# Patient Record
Sex: MALE | Race: WHITE | Employment: FULL TIME | URBAN - METROPOLITAN AREA
[De-identification: names, ages, dates, MRNs, and addresses within clinical notes are randomized per-mention and may not be internally consistent; named-entity substitution may affect disease eponyms.]

---

## 2017-08-21 ENCOUNTER — OFFICE VISIT (OUTPATIENT)
Dept: ORTHOPEDIC SURGERY | Age: 37
End: 2017-08-21

## 2017-08-21 VITALS
SYSTOLIC BLOOD PRESSURE: 123 MMHG | WEIGHT: 195 LBS | HEIGHT: 72 IN | BODY MASS INDEX: 26.41 KG/M2 | DIASTOLIC BLOOD PRESSURE: 72 MMHG

## 2017-08-21 DIAGNOSIS — M25.572 ACUTE LEFT ANKLE PAIN: Primary | ICD-10-CM

## 2017-08-21 DIAGNOSIS — M25.561 ACUTE PAIN OF RIGHT KNEE: ICD-10-CM

## 2017-08-21 PROCEDURE — 99204 OFFICE O/P NEW MOD 45 MIN: CPT | Performed by: ORTHOPAEDIC SURGERY

## 2017-08-21 PROCEDURE — 73564 X-RAY EXAM KNEE 4 OR MORE: CPT | Performed by: ORTHOPAEDIC SURGERY

## 2017-08-21 PROCEDURE — 73610 X-RAY EXAM OF ANKLE: CPT | Performed by: ORTHOPAEDIC SURGERY

## 2017-09-14 ENCOUNTER — OFFICE VISIT (OUTPATIENT)
Dept: ORTHOPEDIC SURGERY | Age: 37
End: 2017-09-14

## 2017-09-14 VITALS — BODY MASS INDEX: 26.41 KG/M2 | WEIGHT: 195 LBS | HEIGHT: 72 IN

## 2017-09-14 DIAGNOSIS — M22.2X1 PATELLOFEMORAL PAIN SYNDROME OF RIGHT KNEE: ICD-10-CM

## 2017-09-14 DIAGNOSIS — M76.31 IT BAND SYNDROME, RIGHT: Primary | ICD-10-CM

## 2017-09-14 PROCEDURE — 99214 OFFICE O/P EST MOD 30 MIN: CPT | Performed by: ORTHOPAEDIC SURGERY

## 2017-09-22 ENCOUNTER — HOSPITAL ENCOUNTER (OUTPATIENT)
Dept: PHYSICAL THERAPY | Age: 37
Discharge: HOME | End: 2017-09-30
Admitting: ORTHOPAEDIC SURGERY

## 2017-09-22 NOTE — PLAN OF CARE
[place] [address]  Phone [phone]   Fax [phone]                                                       Physical Therapy Certification    Dear Referring Practitioner: Dr. Jennifer Rodriguez,    We had the pleasure of evaluating the following patient for physical therapy services at [address] [place] [address]. A summary of our findings can be found in the initial assessment below. This includes our plan of care. If you have any questions or concerns regarding these findings, please do not hesitate to contact me at the office phone number checked above. Thank you for the referral.       Physician Signature:_______________________________Date:__________________  By signing above (or electronic signature), therapists plan is approved by physician      Patient: Julieta Ramon   : 1980   MRN: [id number]  Referring Physician: Referring Practitioner: Dr. Jennifer Rodriguez      Evaluation Date: 2017      Medical Diagnosis Information:  Diagnosis: M76.31 (R ITB syndrome), M22.2X1 (R patellofemoral pain syndome)   Treatment Diagnosis: M76.31 (R ITB syndrome), M22.2X1 (R patellofemoral pain syndome)                                         Insurance information: PT Insurance Information: [place]/60     Precautions/ Contra-indications: none  Latex Allergy:  [x]NO      []YES  Preferred Language for Healthcare:   [x]English       []other:    SUBJECTIVE: Patient stated complaint: Pt. States that he hurt himself backpacking about 6 years ago when he slipped and fell, injuring his L knee. Pt. States that he was able to run marathons before getting injured. Janine Juarez tried PT about 5 and a half years ago without success. Hasn't been able to run much since injury. States that he gets pain after running about 1-3 miles but nothing else reproduces pain.  Janine Juarez says that he tried to take it easy by just biking for a few months and it seemed to help. Tried to get back to running and he had intense pain again after first run. Pt. Had an MRI from [facility] that showed some wear at the L knee inferior pole. Recently patient has been experiencing L ankle pain which has an onset before his R knee. Relevant Medical History: none  Functional Disability Index:PT G-Codes  Functional Assessment Tool Used: LEFS  Score: 74/80  Functional Limitation: Mobility: Walking and moving around  Mobility: Walking and Moving Around Current Status (): At least 1 percent but less than 20 percent impaired, limited or restricted  Mobility: Walking and Moving Around Goal Status (): 0 percent impaired, limited or restricted    Pain Scale: 0/10 currently, 5/10 after running through pain  Easing factors: ibuprofen  Provocative factors: running >3-5 miles     Type: []Constant   [x]Intermittent  []Radiating [x]Localized []other:     Numbness/Tingling: no NT    Occupation/School:      Living Status/Prior Level of Function: Independent with ADLs and IADLs    OBJECTIVE:     ROM LEFT RIGHT   Knee ext -4 -4   Knee Flex 140 140   Strength  LEFT RIGHT   HIP Flexors 5 5   HIP Abductors 5 4+   HIP IR 5 4+   Hip ER 5 4   Knee EXT (quad) 5 4+   Knee Flex (HS) 5 4+   Ankle DF 4+ 5   Ankle PF 5+ 5   Ankle Inv 4 5   Ankle EV 4 5     Reflexes/Sensation:    [x]Dermatomes/Myotomes intact    [x]Reflexes equal and normal bilaterally   []Other:    Joint mobility:    [x]Normal    []Hypo   []Hyper    Palpation: pt. Was not TTP in any region of body    Functional Mobility/Transfers: WNL    Posture: WNL    Bandages/Dressings/Incisions: n/a    Gait: (include devices/WB status) WNL    Orthopedic Special Tests: neg varus/valgus, neg anterior/posterior drawer, neg evelyn compression test                       [x] Patient history, allergies, meds reviewed. Medical chart reviewed. See intake form.      Review Of Systems (ROS):  [x]Performed Review of systems (Integumentary, CardioPulmonary, Neurological) by intake and observation. Intake form has been scanned into medical record. Patient has been instructed to contact their primary care physician regarding ROS issues if not already being addressed at this time. Co-morbidities/Complexities (which will affect course of rehabilitation):   [x]None           Arthritic conditions   []Rheumatoid arthritis (M05.9)  []Osteoarthritis (M19.91)   Cardiovascular conditions   []Hypertension (I10)  []Hyperlipidemia (E78.5)  []Angina pectoris (I20)  []Atherosclerosis (I70)   Musculoskeletal conditions   []Disc pathology   []Congenital spine pathologies   []Prior surgical intervention  []Osteoporosis (M81.8)  []Osteopenia (M85.8)   Endocrine conditions   []Hypothyroid (E03.9)  []Hyperthyroid Gastrointestinal conditions   []Constipation (S71.53)   Metabolic conditions   []Morbid obesity (E66.01)  []Diabetes type 1(E10.65) or 2 (E11.65)   []Neuropathy (G60.9)     Pulmonary conditions   []Asthma (J45)  []Coughing   []COPD (J44.9)   Psychological Disorders  []Anxiety (F41.9)  []Depression (F32.9)   []Other:   []Other:          Barriers to/and or personal factors that will affect rehab potential:              [x]Age  []Sex              [x]Motivation/Lack of Motivation                        []Co-Morbidities              []Cognitive Function, education/learning barriers              []Environmental, home barriers              []profession/work barriers  []past PT/medical experience  []other:  Justification:     Falls Risk Assessment (30 days):   [x] Falls Risk assessed and no intervention required.   [] Falls Risk assessed and Patient requires intervention due to being higher risk   TUG score (>12s at risk):     [] Falls education provided, including       G-Codes:  PT G-Codes  Functional Assessment Tool Used: LEFS  Score: 74/80  Functional Limitation: Mobility: Walking and moving around  Mobility: Walking and Moving Around Current Status (): At least 1 percent but less than 20 percent impaired, limited or restricted  Mobility: Walking and Moving Around Goal Status (): 0 percent impaired, limited or restricted    ASSESSMENT:   Functional Impairments:     []Noted lumbar/proximal hip/LE joint hypomobility   [x]Decreased LE functional ROM   [x]Decreased core/proximal hip strength and neuromuscular control   [x]Decreased LE functional strength   [x]Reduced balance/proprioceptive control   []other:      Functional Activity Limitations (from functional questionnaire and intake)   []Reduced ability to tolerate prolonged functional positions   []Reduced ability or difficulty with changes of positions or transfers between positions   []Reduced ability to maintain good posture and demonstrate good body mechanics with sitting, bending, and lifting   []Reduced ability to sleep   []Reduced ability or tolerance with driving and/or computer work   []Reduced ability to perform lifting, carrying tasks   []Reduced ability to squat   []Reduced ability to forward bend   []Reduced ability to ambulate prolonged functional periods/distances/surfaces   []Reduced ability to ascend/descend stairs   [x]Reduced ability to run, hop, cut or jump   []other:    Participation Restrictions   []Reduced participation in self care activities   []Reduced participation in home management activities   []Reduced participation in work activities   []Reduced participation in social activities. [x]Reduced participation in sport/recreation activities. Classification :    [x]Signs/symptoms consistent with decreased ROM, strength and function.    []Signs/symptoms consistent with joint sprain/strain   [x]Signs/symptoms consistent with patella-femoral syndrome   []Signs/symptoms consistent with knee OA/hip OA   [x]Signs/symptoms consistent with internal derangement of knee/Hip   [x]Signs/symptoms consistent with functional hip weakness/NMR control      []Signs/symptoms consistent with tendinitis/tendinosis    [x]signs/symptoms consistent with pathology which may benefit from Dry needling      []other:      Prognosis/Rehab Potential:      [x]Excellent   []Good    []Fair   []Poor    Tolerance of evaluation/treatment:    [x]Excellent   []Good    []Fair   []Poor    Physical Therapy Evaluation Complexity Justification  [] A history of present problem with:  [x] no personal factors and/or comorbidities that impact the plan of care;  []1-2 personal factors and/or comorbidities that impact the plan of care  []3 personal factors and/or comorbidities that impact the plan of care  [] An examination of body systems using standardized tests and measures addressing any of the following: body structures and functions (impairments), activity limitations, and/or participation restrictions;:  [x] a total of 1-2 or more elements   [] a total of 3 or more elements   [] a total of 4 or more elements   [] A clinical presentation with:  [x] stable and/or uncomplicated characteristics   [] evolving clinical presentation with changing characteristics  [] unstable and unpredictable characteristics;   [] Clinical decision making of [x] low, [] moderate, [] high complexity using standardized patient assessment instrument and/or measurable assessment of functional outcome. [x] EVAL (LOW) 35156 (typically 30 minutes face-to-face)  [] EVAL (MOD) 42742 (typically 30 minutes face-to-face)  [] EVAL (HIGH) 82029 (typically 45 minutes face-to-face)  [] RE-EVAL     PLAN: Pt. Instructed not to run for the next week. Complete HEP daily and return for therapy session late next week. Progress therapy as appropriate, as patient is very physically active and motivated.     Frequency/Duration:  1-2 days per week for 8 Weeks:  Interventions:  [x] Therapeutic exercise including: strength training, ROM, for Lower extremity and core   [x] NMR activation and proprioception for LE, Glutes and Core   [x] Manual therapy as indicated for LE, Hip and spine to include: Dry Needling/IASTM, STM, PROM, Gr I-IV mobilizations, manipulation. [x] Modalities as needed that may include: thermal agents, E-stim, Biofeedback, US, iontophoresis as indicated  [x] Patient education on joint protection, postural re-education, activity modification, progression of HEP. HEP instruction: (see scanned forms)    GOALS:  Patient stated goal: \"run 5-10 miles w/o pain and run marathon w/o pain)    Therapist goals for Patient:   Short Term Goals: To be achieved in: 2 weeks  1. Independent in HEP and progression per patient tolerance, in order to prevent re-injury. 2. Patient will have a decrease in pain to facilitate improvement in movement, function, and ADLs as indicated by Functional Deficits. Long Term Goals: To be achieved in: 8 weeks  1. Disability index score of 0% or less for the LEFS to assist with reaching prior level of function. 2. Patient will demonstrate an increase in LE strength to 5/5 to allow for proper functional mobility as indicated by patients Functional Deficits. 3. Patient will return to all functional/recreational activities without increased symptoms or restriction. 4. Patient will run 10 miles w/o pain    Therapist was present, directed the patient's care, made skilled judgement, and was responsible for assessment and treatment of the patient. Fredrick Quinonez SPT    Electronically signed by:   Travis Dee PT

## 2017-09-22 NOTE — FLOWSHEET NOTE
[address], [facility]    Physical Therapy Daily Treatment Note  Date:  2017    Patient Name:  Yuli Arnett    :  1980  MRN: [id number]  Restrictions/Precautions:    Medical/Treatment Diagnosis Information:  Diagnosis: M76.31 (R ITB syndrome), M22.2X1 (R patellofemoral pain syndome)  Treatment Diagnosis: M76.31 (R ITB syndrome), M22.2X1 (R patellofemoral pain syndome)  Insurance/Certification information:  PT Insurance Information: [place]/60  Physician Information:  Referring Practitioner: Dr. Ezra Lan of care signed (Y/N):     Date of Patient follow up with Physician:     G-Code (if applicable):      Date G-Code Applied:    PT G-Codes  Functional Assessment Tool Used: LEFS  Score: 74/80  Functional Limitation: Mobility: Walking and moving around  Mobility: Walking and Moving Around Current Status ():  At least 1 percent but less than 20 percent impaired, limited or restricted  Mobility: Walking and Moving Around Goal Status (): 0 percent impaired, limited or restricted    Progress Note: [x]  Yes  []  No  Next due by: Visit #10       Latex Allergy:  [x]NO      []YES  Preferred Language for Healthcare:   [x]English       []other:    Visit # Insurance Allowable   1 60     Pain level: 0/10 currently, 5/10 after running through pain    SUBJECTIVE:  See eval    OBJECTIVE: See eval  Observation:   Test measurements:      RESTRICTIONS/PRECAUTIONS:  none    Exercises/Interventions:     Therapeutic Ex  30' Resistance Sets/sec Reps Notes   ITB stretch   3 30 secs R   Quad Sets    x30 R   Clam shells  3 30 B   Donkey kicks 2#   R   4 way ankle Black t-band  x30 L   Trampoline ball toss Green ball  x30 SL balance (L) on blue foam   Retro Stepper/BIKE       Sport[place] March       3 way SLR       SAQ       Bosu fwd/side lunge       Slide Lunge       Leg Press Ecc 0-       Cybex HS curl       TKE       Glute side walks       BOSU squat navigation      Manual Treatments:  PROM / STM / Oscillations-Mobs:  G-I, II, III, IV (PA's, Inf., Post.)  [] (91661) Provided manual therapy to mobilize LE, proximal hip and/or LS spine soft tissue/joints for the purpose of modulating pain, promoting relaxation,  increasing ROM, reducing/eliminating soft tissue swelling/inflammation/restriction, improving soft tissue extensibility and allowing for proper ROM for normal function with self care, mobility, lifting and ambulation. Modalities:      Charges:  Timed Code Treatment Minutes: eval + 30   Total Treatment Minutes: 60 mins     [x] EVAL  [x] LR(86518) x  2   [] IONTO  [] NMR (65372) x      [] VASO  [] Manual (20164) x       [] Other:  [] TA x       [] Mech Traction (50070)  [] ES(attended) (55406)      [] ES (un) (38397):     GOALS:   Patient stated goal: \"run 5-10 miles w/o pain and run marathon w/o pain)    Therapist goals for Patient:   Short Term Goals: To be achieved in: 2 weeks  1. Independent in HEP and progression per patient tolerance, in order to prevent re-injury. 2. Patient will have a decrease in pain to facilitate improvement in movement, function, and ADLs as indicated by Functional Deficits. Long Term Goals: To be achieved in: 8 weeks  1. Disability index score of 0% or less for the LEFS to assist with reaching prior level of function. 2. Patient will demonstrate an increase in LE strength to 5/5 to allow for proper functional mobility as indicated by patients Functional Deficits. 3. Patient will return to all functional/recreational activities without increased symptoms or restriction. 4. Patient will run 10 miles w/o pain    Progression Towards Functional goals:  [] Patient is progressing as expected towards functional goals listed. [] Progression is slowed due to complexities listed. [] Progression has been slowed due to co-morbidities.   [x] Plan just implemented, too soon to assess goals progression  [] Other: ASSESSMENT:  See eval    Treatment/Activity Tolerance:  [x] Patient tolerated treatment well [] Patient limited by fatique  [] Patient limited by pain  [] Patient limited by other medical complications  [] Other:     Prognosis: [x] Good [] Fair  [] Poor    Patient Requires Follow-up: [x] Yes  [] No    PLAN: See eval  [] Continue per plan of care [] Alter current plan (see comments)  [x] Plan of care initiated [] Hold pending MD visit [] Discharge    Therapist was present, directed the patient's care, made skilled judgement, and was responsible for assessment and treatment of the patient. Aguila Calvo SPT    Electronically signed by:  Nelson Rahman PT

## 2017-10-03 ENCOUNTER — HOSPITAL ENCOUNTER (OUTPATIENT)
Dept: PHYSICAL THERAPY | Age: 37
Discharge: HOME | End: 2017-10-03
Admitting: ORTHOPAEDIC SURGERY

## 2017-10-03 NOTE — FLOWSHEET NOTE
[address], [facility]    Physical Therapy Daily Treatment Note  Date:  10/3/2017    Patient Name:  Alma Conroy    :  1980  MRN: [id number]  Restrictions/Precautions:    Medical/Treatment Diagnosis Information:  Diagnosis: M76.31 (R ITB syndrome), M22.2X1 (R patellofemoral pain syndome)  Treatment Diagnosis: M76.31 (R ITB syndrome), M22.2X1 (R patellofemoral pain syndome)  Insurance/Certification information:  PT Insurance Information: [place]/60  Physician Information:  Referring Practitioner: Dr. Anjelica Bay of care signed (Y/N):     Date of Patient follow up with Physician:     G-Code (if applicable):      Date G-Code Applied:         Progress Note: [x]  Yes  []  No  Next due by: Visit #10       Latex Allergy:  [x]NO      []YES  Preferred Language for Healthcare:   [x]English       []other:    Visit # Insurance Allowable   2 60     Pain level: 0/10 currently, 5/10 after running through pain    SUBJECTIVE:  Pt reports no pain, but d/t holding on all activity     OBJECTIVE:   Observation:   Test measurements:      RESTRICTIONS/PRECAUTIONS:  none    Exercises/Interventions:     Therapeutic Ex  30' Resistance Sets/sec Reps Notes   ITB stretch   3 30 secs R   Quad Sets    x30 R   Clam shells [place] 3 30 B   Donkey kicks 2#   R   4 way ankle Black t-band  x30 HEP          Elliptical 5 min             Wall sits 43 MB 3 30s    RDL 2# 2 10    Bosu fwd/side lunge       Slide Lunge  2 10 B   Glute side walks teal  4 laps    BOSU squat              Manual Intervention       Knee mobs/PROM       Tib/Fem Mobs       NMR re-education       SL rebounder 6#  x25 B   SL balance Blue foam 2 30 s 2# water stick  Body blade          Therapeutic Exercise and NMR EXR  [x] (71365) Provided verbal/tactile cueing for activities related to strengthening, flexibility, endurance, ROM for improvements in LE, proximal hip, and core control with self care, mobility, lifting, (99179) x  1   [] VASO  [] Manual (77760) x       [] Other:  [] TA x       [] Mech Traction (65508)  [] ES(attended) (26968)      [] ES (un) (00399):     GOALS:   Patient stated goal: \"run 5-10 miles w/o pain and run marathon w/o pain)    Therapist goals for Patient:   Short Term Goals: To be achieved in: 2 weeks  1. Independent in HEP and progression per patient tolerance, in order to prevent re-injury. 2. Patient will have a decrease in pain to facilitate improvement in movement, function, and ADLs as indicated by Functional Deficits. Long Term Goals: To be achieved in: 8 weeks  1. Disability index score of 0% or less for the LEFS to assist with reaching prior level of function. 2. Patient will demonstrate an increase in LE strength to 5/5 to allow for proper functional mobility as indicated by patients Functional Deficits. 3. Patient will return to all functional/recreational activities without increased symptoms or restriction. 4. Patient will run 10 miles w/o pain    Progression Towards Functional goals:  [] Patient is progressing as expected towards functional goals listed. [] Progression is slowed due to complexities listed. [] Progression has been slowed due to co-morbidities. [x] Plan just implemented, too soon to assess goals progression  [] Other:     ASSESSMENT:  Pt reported no pain with treatment progressions     Treatment/Activity Tolerance:  [x] Patient tolerated treatment well [] Patient limited by fatique  [] Patient limited by pain  [] Patient limited by other medical complications  [] Other:     Prognosis: [x] Good [] Fair  [] Poor    Patient Requires Follow-up: [x] Yes  [] No    PLAN: Pt to run before NPV to provoke pain. [] Continue per plan of care [] Alter current plan (see comments)  [x] Plan of care initiated [] Hold pending MD visit [] Discharge    Electronically signed by:  Nelson Rahman PT

## 2017-10-10 ENCOUNTER — HOSPITAL ENCOUNTER (OUTPATIENT)
Dept: PHYSICAL THERAPY | Age: 37
Discharge: HOME | End: 2017-10-10
Admitting: ORTHOPAEDIC SURGERY

## 2017-10-10 NOTE — FLOWSHEET NOTE
[address], [facility]    Physical Therapy Daily Treatment Note  Date:  10/10/2017    Patient Name:  Arlen Desai    :  1980  MRN: [id number]  Restrictions/Precautions:    Medical/Treatment Diagnosis Information:  Diagnosis: M76.31 (R ITB syndrome), M22.2X1 (R patellofemoral pain syndome)  Treatment Diagnosis: M76.31 (R ITB syndrome), M22.2X1 (R patellofemoral pain syndome)  Insurance/Certification information:  PT Insurance Information: [place]/60  Physician Information:  Referring Practitioner: Dr. Roya Wright of care signed (Y/N):     Date of Patient follow up with Physician:     G-Code (if applicable):      Date G-Code Applied:         Progress Note: [x]  Yes  []  No  Next due by: Visit #10       Latex Allergy:  [x]NO      []YES  Preferred Language for Healthcare:   [x]English       []other:    Visit # Insurance Allowable   3 60     Pain level: 0/10 currently, 5/10 after running through pain    SUBJECTIVE:  Pt ran 2.5 mi this morning with slight pain in the front of the knee.       OBJECTIVE:   Observation:   Test measurements:      RESTRICTIONS/PRECAUTIONS:  none    Exercises/Interventions:     Therapeutic Ex  30' Resistance Sets/sec Reps Notes   ITB stretch   3 30 secs R   Quad Sets    x30 R   Clam shells Maroon 3 30 B   Donkey kicks [place]  2 15 B   4 way ankle Black t-band  x30 HEP          Elliptical 5 min      treadmill 7 min   1 mi          Wall sits 43 MB 3 30s     2# 2 10    Bosu fwd/side lunge       Slide Lunge  2 10 B   Glute side walks teal  4 laps    BOSU squat              Manual Intervention       Knee mobs/PROM       Tib/Fem Mobs       NMR re-education       SL rebounder 6#  x25 B   SL balance Blue foam 2 30 s 2# water stick  Body blade          Therapeutic Exercise and NMR EXR  [x] (00566) Provided verbal/tactile cueing for activities related to strengthening, flexibility, endurance, ROM for improvements in LE, proximal hip, and core control with self care, mobility, lifting, ambulation.  [] (02581) Provided verbal/tactile cueing for activities related to improving balance, coordination, kinesthetic sense, posture, motor skill, proprioception  to assist with LE, proximal hip, and core control in self care, mobility, lifting, ambulation and eccentric single leg control. NMR and Therapeutic Activities:    [] (98563 or 76701) Provided verbal/tactile cueing for activities related to improving balance, coordination, kinesthetic sense, posture, motor skill, proprioception and motor activation to allow for proper function of core, proximal hip and LE with self care and ADLs  [] (80087) Gait Re-education- Provided training and instruction to the patient for proper LE, core and proximal hip recruitment and positioning and eccentric body weight control with ambulation re-education including up and down stairs     Home Exercise Program:    [x] (82336) Reviewed/Progressed HEP activities related to strengthening, flexibility, endurance, ROM of core, proximal hip and LE for functional self-care, mobility, lifting and ambulation/stair navigation   [] (79609)Reviewed/Progressed HEP activities related to improving balance, coordination, kinesthetic sense, posture, motor skill, proprioception of core, proximal hip and LE for self care, mobility, lifting, and ambulation/stair navigation      Manual Treatments:  PROM / STM / Oscillations-Mobs:  G-I, II, III, IV (PA's, Inf., Post.)  [] (60753) Provided manual therapy to mobilize LE, proximal hip and/or LS spine soft tissue/joints for the purpose of modulating pain, promoting relaxation,  increasing ROM, reducing/eliminating soft tissue swelling/inflammation/restriction, improving soft tissue extensibility and allowing for proper ROM for normal function with self care, mobility, lifting and ambulation.      Modalities:      Charges:  Timed Code Treatment Minutes: 50 min   Total Treatment Minutes: 50 mins

## 2017-10-17 ENCOUNTER — HOSPITAL ENCOUNTER (OUTPATIENT)
Dept: PHYSICAL THERAPY | Age: 37
Discharge: HOME | End: 2017-10-17
Admitting: ORTHOPAEDIC SURGERY

## 2017-10-17 NOTE — FLOWSHEET NOTE
strengthening, flexibility, endurance, ROM for improvements in LE, proximal hip, and core control with self care, mobility, lifting, ambulation.  [] (87924) Provided verbal/tactile cueing for activities related to improving balance, coordination, kinesthetic sense, posture, motor skill, proprioception  to assist with LE, proximal hip, and core control in self care, mobility, lifting, ambulation and eccentric single leg control. NMR and Therapeutic Activities:    [] (15948 or 99671) Provided verbal/tactile cueing for activities related to improving balance, coordination, kinesthetic sense, posture, motor skill, proprioception and motor activation to allow for proper function of core, proximal hip and LE with self care and ADLs  [] (53176) Gait Re-education- Provided training and instruction to the patient for proper LE, core and proximal hip recruitment and positioning and eccentric body weight control with ambulation re-education including up and down stairs     Home Exercise Program:    [x] (68269) Reviewed/Progressed HEP activities related to strengthening, flexibility, endurance, ROM of core, proximal hip and LE for functional self-care, mobility, lifting and ambulation/stair navigation   [] (70818)Reviewed/Progressed HEP activities related to improving balance, coordination, kinesthetic sense, posture, motor skill, proprioception of core, proximal hip and LE for self care, mobility, lifting, and ambulation/stair navigation      Manual Treatments:  PROM / STM / Oscillations-Mobs:  G-I, II, III, IV (PA's, Inf., Post.)  [] (97793) Provided manual therapy to mobilize LE, proximal hip and/or LS spine soft tissue/joints for the purpose of modulating pain, promoting relaxation,  increasing ROM, reducing/eliminating soft tissue swelling/inflammation/restriction, improving soft tissue extensibility and allowing for proper ROM for normal function with self care, mobility, lifting and ambulation.      Modalities: Charges:  Timed Code Treatment Minutes: 50 min   Total Treatment Minutes: 50 mins     [] EVAL  [x] VH(50234) x  2   [] IONTO  [x] NMR (62646) x  1   [] VASO  [] Manual (30436) x       [] Other:  [] TA x       [] Mech Traction (89799)  [] ES(attended) (62317)      [] ES (un) (40085):     GOALS:   Patient stated goal: \"run 5-10 miles w/o pain and run marathon w/o pain)    Therapist goals for Patient:   Short Term Goals: To be achieved in: 2 weeks  1. Independent in HEP and progression per patient tolerance, in order to prevent re-injury. 2. Patient will have a decrease in pain to facilitate improvement in movement, function, and ADLs as indicated by Functional Deficits. Long Term Goals: To be achieved in: 8 weeks  1. Disability index score of 0% or less for the LEFS to assist with reaching prior level of function. 2. Patient will demonstrate an increase in LE strength to 5/5 to allow for proper functional mobility as indicated by patients Functional Deficits. 3. Patient will return to all functional/recreational activities without increased symptoms or restriction. 4. Patient will run 10 miles w/o pain    Progression Towards Functional goals:  [] Patient is progressing as expected towards functional goals listed. [] Progression is slowed due to complexities listed. [] Progression has been slowed due to co-morbidities. [x] Plan just implemented, too soon to assess goals progression  [] Other:     ASSESSMENT:  Pt continues to report no pain with increase in running distance/treatment progressions. Pt fatigued following treatment. Treatment/Activity Tolerance:  [x] Patient tolerated treatment well [] Patient limited by fatique  [] Patient limited by pain  [] Patient limited by other medical complications  [] Other:     Prognosis: [x] Good [] Fair  [] Poor    Patient Requires Follow-up: [x] Yes  [] No    PLAN: Pt to schedule out in 2 weeks.  Assess response to increase in running distances this week.    [] Continue per plan of care [] Alter current plan (see comments)  [x] Plan of care initiated [] Hold pending MD visit [] Discharge    Electronically signed by:  Nelson Rahman PT

## 2017-11-01 ENCOUNTER — HOSPITAL ENCOUNTER (OUTPATIENT)
Dept: PHYSICAL THERAPY | Age: 37
Discharge: HOME | End: 2017-11-30
Attending: ORTHOPAEDIC SURGERY | Admitting: ORTHOPAEDIC SURGERY

## 2017-11-07 ENCOUNTER — HOSPITAL ENCOUNTER (OUTPATIENT)
Dept: PHYSICAL THERAPY | Age: 37
Discharge: HOME | End: 2017-11-07
Admitting: ORTHOPAEDIC SURGERY

## 2017-11-07 NOTE — FLOWSHEET NOTE
self care, mobility, lifting and ambulation. Dry needling manual therapy: consisted on the placement of 8 needles in the following muscles:  distal ITB, patellar tendon, quad tendon. A 40 mm needle was inserted, piston, rotated, and coned to produce intramuscular mobilization. These techniques were used to restore functional range of motion, reduce muscle spasm and induce healing in the corresponding musculature. (75469)  Clean Technique was utilized today while applying Dry needling treatment. The treatment sites where cleaned with 70% solution of  isopropyl alcohol . The PT washed their hands and utilized treatment gloves along with hand  prior to inserting the needles. All needles where removed and discarded in the appropriate sharps container. Modalities:  Heat x10 min    Charges:  Timed Code Treatment Minutes: 40 min   Total Treatment Minutes: 40 mins     [] EVAL  [x] QS(36151) x  1   [] IONTO  [] NMR (96690) x      [] VASO  [x] Manual (70279) x  2    [] Other:  [] TA x       [] Mech Traction (37992)  [] ES(attended) (34326)      [] ES (un) (44192):     GOALS:   Patient stated goal: \"run 5-10 miles w/o pain and run marathon w/o pain)    Therapist goals for Patient:   Short Term Goals: To be achieved in: 2 weeks  1. Independent in HEP and progression per patient tolerance, in order to prevent re-injury. 2. Patient will have a decrease in pain to facilitate improvement in movement, function, and ADLs as indicated by Functional Deficits. Long Term Goals: To be achieved in: 8 weeks  1. Disability index score of 0% or less for the LEFS to assist with reaching prior level of function. 2. Patient will demonstrate an increase in LE strength to 5/5 to allow for proper functional mobility as indicated by patients Functional Deficits. 3. Patient will return to all functional/recreational activities without increased symptoms or restriction.    4. Patient will run 10 miles w/o pain    Progression Towards Functional goals:  [] Patient is progressing as expected towards functional goals listed. [] Progression is slowed due to complexities listed. [] Progression has been slowed due to co-morbidities. [x] Plan just implemented, too soon to assess goals progression  [] Other:     ASSESSMENT:       Treatment/Activity Tolerance:  [x] Patient tolerated treatment well [] Patient limited by fatique  [] Patient limited by pain  [] Patient limited by other medical complications  [] Other:     Prognosis: [x] Good [] Fair  [] Poor    Patient Requires Follow-up: [x] Yes  [] No    PLAN: Pt to schedule next week, pt to increase running distance. Assess response to DN. [x] Continue per plan of care [] Alter current plan (see comments)  [] Plan of care initiated [] Hold pending MD visit [] Discharge    Electronically signed by:  Nelson Rahman PT

## 2017-11-14 ENCOUNTER — HOSPITAL ENCOUNTER (OUTPATIENT)
Dept: PHYSICAL THERAPY | Age: 37
Discharge: HOME | End: 2017-11-14
Admitting: ORTHOPAEDIC SURGERY

## 2017-11-14 NOTE — FLOWSHEET NOTE
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 Will Jolley of care signed (Y/N):     Date of Patient follow up with Physician:     G-Code (if applicable):      Date G-Code Applied:         Progress Note: [x]  Yes  []  No  Next due by: Visit #10       Latex Allergy:  [x]NO      []YES  Preferred Language for Healthcare:   [x]English       []other:    Visit # Insurance Allowable   6 60     Pain level: 0/10     SUBJECTIVE:  Pt ran 3 miles two times in the last week with no knee pain. Pt reports mild L ankle pain following run.      OBJECTIVE:   Observation:   Test measurements:      RESTRICTIONS/PRECAUTIONS:  none    Exercises/Interventions:     Therapeutic Ex  15' Resistance Sets/sec Reps Notes   Quad Sets    x30     4 way ankle Black t-band   x30 HEP           Clam shells Maroon 3 30 B   Donkey kicks Teal 2 15 B          Elliptical 5 min       7 min   1 mi          TRX DL/SL squat   2 10    Wall sits  4 30s     2# 2 10    Bosu fwd/side lunge  2 10 B   Slide Lunge  2 10 B   Glute side walks teal  4 laps    BOSU squat  2 10           Manual Intervention       Knee mobs/PROM/STM  25 min  R knee distal ITB insertion, quad tendon   DN  5  R knee distal ITB insertion, quad tendon   NMR re-education       SL rebounder 6#  x25 B- foam   SL balance Blue foam 2 30 s 2# water stick  Body blade          Therapeutic Exercise and NMR EXR  [x] (87953) Provided verbal/tactile cueing for activities related to strengthening, flexibility, endurance, ROM for improvements in LE, proximal hip, and core control with self care, mobility, lifting, ambulation.  [] (22209) Provided verbal/tactile cueing for activities related to improving balance, coordination, kinesthetic sense, posture, motor skill, proprioception  to assist with LE, proximal hip, and core control in self care, mobility, lifting, ambulation and eccentric single leg control. NMR and Therapeutic Activities:    [] (20708 or 80118) Provided verbal/tactile cueing for activities related to improving balance, coordination, kinesthetic sense, posture, motor skill, proprioception and motor activation to allow for proper function of core, proximal hip and LE with self care and ADLs  [] (02275) Gait Re-education- Provided training and instruction to the patient for proper LE, core and proximal hip recruitment and positioning and eccentric body weight control with ambulation re-education including up and down stairs     Home Exercise Program:    [x] (47980) Reviewed/Progressed HEP activities related to strengthening, flexibility, endurance, ROM of core, proximal hip and LE for functional self-care, mobility, lifting and ambulation/stair navigation   [] (45748)Reviewed/Progressed HEP activities related to improving balance, coordination, kinesthetic sense, posture, motor skill, proprioception of core, proximal hip and LE for self care, mobility, lifting, and ambulation/stair navigation      Manual Treatments:  PROM / STM / Oscillations-Mobs:  G-I, II, III, IV (PA's, Inf., Post.)  [x] (22499) Provided manual therapy to mobilize LE, proximal hip and/or LS spine soft tissue/joints for the purpose of modulating pain, promoting relaxation,  increasing ROM, reducing/eliminating soft tissue swelling/inflammation/restriction, improving soft tissue extensibility and allowing for proper ROM for normal function with self care, mobility, lifting and ambulation.    Dry needling manual

## 2017-11-21 ENCOUNTER — HOSPITAL ENCOUNTER (OUTPATIENT)
Dept: PHYSICAL THERAPY | Age: 37
Discharge: HOME | End: 2017-11-21
Admitting: ORTHOPAEDIC SURGERY

## 2017-12-01 ENCOUNTER — HOSPITAL ENCOUNTER (OUTPATIENT)
Dept: PHYSICAL THERAPY | Age: 37
Discharge: HOME | End: 2017-12-31
Attending: ORTHOPAEDIC SURGERY | Admitting: ORTHOPAEDIC SURGERY

## 2019-03-14 ENCOUNTER — OFFICE VISIT (OUTPATIENT)
Dept: ORTHOPEDIC SURGERY | Age: 39
End: 2019-03-14
Payer: COMMERCIAL

## 2019-03-14 VITALS — BODY MASS INDEX: 26.41 KG/M2 | HEIGHT: 72 IN | WEIGHT: 195 LBS

## 2019-03-14 DIAGNOSIS — M25.512 ACUTE PAIN OF LEFT SHOULDER: Primary | ICD-10-CM

## 2019-03-14 PROCEDURE — 99214 OFFICE O/P EST MOD 30 MIN: CPT | Performed by: ORTHOPAEDIC SURGERY

## 2019-03-25 ENCOUNTER — HOSPITAL ENCOUNTER (OUTPATIENT)
Dept: PHYSICAL THERAPY | Age: 39
Setting detail: THERAPIES SERIES
Discharge: HOME | End: 2019-03-25
Payer: COMMERCIAL

## 2019-03-25 PROCEDURE — 97110 THERAPEUTIC EXERCISES: CPT

## 2019-03-25 PROCEDURE — 97161 PT EVAL LOW COMPLEX 20 MIN: CPT

## 2019-04-01 ENCOUNTER — HOSPITAL ENCOUNTER (OUTPATIENT)
Dept: PHYSICAL THERAPY | Age: 39
Setting detail: THERAPIES SERIES
Discharge: HOME | End: 2019-04-01
Payer: COMMERCIAL

## 2019-04-01 PROCEDURE — 97140 MANUAL THERAPY 1/> REGIONS: CPT

## 2019-04-01 PROCEDURE — 97110 THERAPEUTIC EXERCISES: CPT

## 2019-04-01 NOTE — FLOWSHEET NOTE
[facility]    Physical Therapy Daily Treatment Note  Date:  2019    Patient Name:  Guillermina Rowland    :  1980  MRN: [id number]  Restrictions/Precautions:    Medical/Treatment Diagnosis Information:  · Diagnosis: Acute pain of left shoulder  · Treatment Diagnosis: L shoulder pain  Insurance/Certification information:  PT Insurance Information: [place], $0, $0 co-pay, 61 outpatient visits  Physician Information:  Referring Practitioner: Dr. Sena Hearing of care signed (Y/N):     Date of Patient follow up with Physician:     G-Code (if applicable):      Date G-Code Applied:         Progress Note: [x]  Yes  []  No  Next due by: Visit #10      Latex Allergy:  [x]NO      []YES  Preferred Language for Healthcare:   [x]English       []other:    Visit # Insurance Allowable   2 60     Pain level:  0/10     SUBJECTIVE: Pt reports a funny feeling in his calf, has only been able to stretch/perform HEP a few times last week.       OBJECTIVE: See eval  Observation:   Test measurements:      RESTRICTIONS/PRECAUTIONS: n/a    Exercises/Interventions:   Therapeutic Ex 15' Wt / Resistance Sets/sec Reps Notes   TB rows/ext/IR/ER blue 2 10    Calf stretch  6 30\" Slant board   Eccentric Calf raises  Calf raises  2  3 10  10   Half foam   MH heel raises 60# 3 10                                                                                                             Manual Intervention 5'       IASTM/STM  15'  Distal R calf                                             NMR re-education       SLS  3 30s                                                         Therapeutic Exercise and NMR EXR  [x] (40614) Provided verbal/tactile cueing for activities related to strengthening, flexibility, endurance, ROM  for improvements in scapular, scapulothoracic and UE control with self care, reaching, carrying, lifting, house/yardwork, driving/computer work.    [] (42756) Provided verbal/tactile cueing for activities related to improving balance, coordination, kinesthetic sense, posture, motor skill, proprioception  to assist with  scapular, scapulothoracic and UE control with self care, reaching, carrying, lifting, house/yardwork, driving/computer work. Therapeutic Activities:    [] (67477 or 24684) Provided verbal/tactile cueing for activities related to improving balance, coordination, kinesthetic sense, posture, motor skill, proprioception and motor activation to allow for proper function of scapular, scapulothoracic and UE control with self care, carrying, lifting, driving/computer work.      Home Exercise Program:    [x] (47731) Reviewed/Progressed HEP activities related to strengthening, flexibility, endurance, ROM of scapular, scapulothoracic and UE control with self care, reaching, carrying, lifting, house/yardwork, driving/computer work  [] (50832) Reviewed/Progressed HEP activities related to improving balance, coordination, kinesthetic sense, posture, motor skill, proprioception of scapular, scapulothoracic and UE control with self care, reaching, carrying, lifting, house/yardwork, driving/computer work      Manual Treatments:  PROM / STM / Oscillations-Mobs:  G-I, II, III, IV (PA's, Inf., Post.)  [] (65765) Provided manual therapy to mobilize soft tissue/joints of cervical/CT, scapular GHJ and UE for the purpose of modulating pain, promoting relaxation,  increasing ROM, reducing/eliminating soft tissue swelling/inflammation/restriction, improving soft tissue extensibility and allowing for proper ROM for normal function with self care, reaching, carrying, lifting, house/yardwork, driving/computer work    Modalities: heat x10 min     Charges:  Timed Code Treatment Minutes: 35   Total Treatment Minutes: 45     [] EVAL  [x] YN(08356) x  1   [] IONTO  [] NMR (76615) x      [] VASO  [x] Manual (12030) x  1    [] Other:  [] TA x       [] Mech Traction (11016)  [] ES(attended) (75641)      [] ES (un) (43647):     GOALS:  Patient stated goal: Learn stretches and strength exercises for shoulder    Therapist goals for Patient:   Short Term Goals: To be achieved in: 2 weeks  1. Independent in HEP and progression per patient tolerance, in order to prevent re-injury. 2. Patient will have a decrease in pain to facilitate improvement in movement, function, and ADLs as indicated by Functional Deficits. Long Term Goals: To be achieved in: 8 weeks  1. Disability index score of 0% or less for the DASH to assist with reaching prior level of function. 2. Patient will demonstrate no pain at end range of motion in L shoulder flexion and ABD. 3. Patient will report performing 30 push-ups without any pain during or 48 hours after. 4. Patient will return to  functional activities without increased symptoms or restriction. Progression Towards Functional goals:  [] Patient is progressing as expected towards functional goals listed. [] Progression is slowed due to complexities listed. [] Progression has been slowed due to co-morbidities. [x] Plan just implemented, too soon to assess goals progression  [] Other:     ASSESSMENT: Pt reports no pain with gastroc focused therex. Pt demonstrates difficulty with SL stance on uneven surfaces. Treatment/Activity Tolerance:  [x] Patient tolerated treatment well [] Patient limited by fatique  [] Patient limited by pain  [] Patient limited by other medical complications  [] Other:     Prognosis: [x] Good [] Fair  [] Poor    Patient Requires Follow-up: [x] Yes  [] No    PLAN: Progress gastroc strength in order to negotiate stairs; consider DN NPV  [] Continue per plan of care [] Alter current plan (see comments)  [x] Plan of care initiated [] Hold pending MD visit [] Discharge    Electronically signed by:  Chely Glover PT DPT

## 2019-04-08 ENCOUNTER — HOSPITAL ENCOUNTER (OUTPATIENT)
Dept: PHYSICAL THERAPY | Age: 39
Setting detail: THERAPIES SERIES
Discharge: HOME | End: 2019-04-08
Payer: COMMERCIAL

## 2019-04-08 PROCEDURE — 97140 MANUAL THERAPY 1/> REGIONS: CPT

## 2019-04-08 PROCEDURE — 97110 THERAPEUTIC EXERCISES: CPT

## 2019-04-08 NOTE — FLOWSHEET NOTE
[facility]    Physical Therapy Daily Treatment Note  Date:  2019    Patient Name:  Oscar Pitts    :  1980  MRN: [id number]  Restrictions/Precautions:    Medical/Treatment Diagnosis Information:  · Diagnosis: Acute pain of left shoulder  · Treatment Diagnosis: L shoulder pain  Insurance/Certification information:  PT Insurance Information: [place], $0, $0 co-pay, 61 outpatient visits  Physician Information:  Referring Practitioner: Dr. Anisa Lancaster of care signed (Y/N):     Date of Patient follow up with Physician:     G-Code (if applicable):      Date G-Code Applied:         Progress Note: [x]  Yes  []  No  Next due by: Visit #10      Latex Allergy:  [x]NO      []YES  Preferred Language for Healthcare:   [x]English       []other:    Visit # Insurance Allowable   3 60     Pain level:  0/10     SUBJECTIVE: PT reports his calf muscles were sore after last visit, has no experienced any pain but also has not challenged leg with running.      OBJECTIVE: See eval  Observation:   Test measurements:      RESTRICTIONS/PRECAUTIONS: n/a    Exercises/Interventions:   Therapeutic Ex 15' Wt / Resistance Sets/sec Reps Notes   TB rows/ext/IR/ER blue 2 10    Calf stretch  6 30\" Slant board   Eccentric Calf raises  Calf raises  2  3 10  10   Half foam   MH heel raises 60# 3 10                                                                                                             Manual Intervention 5'       IASTM/STM  15'  Distal R calf                                             NMR re-education       SLS  3 30s joaquina disc                                                        Therapeutic Exercise and NMR EXR  [x] (62653) Provided verbal/tactile cueing for activities related to strengthening, flexibility, endurance, ROM  for improvements in scapular, scapulothoracic and UE control with self care, reaching, carrying, lifting, house/yardwork, driving/computer work.    [] (40732) Provided verbal/tactile cueing for activities related to improving balance, coordination, kinesthetic sense, posture, motor skill, proprioception  to assist with  scapular, scapulothoracic and UE control with self care, reaching, carrying, lifting, house/yardwork, driving/computer work. Therapeutic Activities:    [] (47121 or 11358) Provided verbal/tactile cueing for activities related to improving balance, coordination, kinesthetic sense, posture, motor skill, proprioception and motor activation to allow for proper function of scapular, scapulothoracic and UE control with self care, carrying, lifting, driving/computer work.      Home Exercise Program:    [x] (16434) Reviewed/Progressed HEP activities related to strengthening, flexibility, endurance, ROM of scapular, scapulothoracic and UE control with self care, reaching, carrying, lifting, house/yardwork, driving/computer work  [] (68007) Reviewed/Progressed HEP activities related to improving balance, coordination, kinesthetic sense, posture, motor skill, proprioception of scapular, scapulothoracic and UE control with self care, reaching, carrying, lifting, house/yardwork, driving/computer work      Manual Treatments:  PROM / STM / Oscillations-Mobs:  G-I, II, III, IV (PA's, Inf., Post.)  [] (87890) Provided manual therapy to mobilize soft tissue/joints of cervical/CT, scapular GHJ and UE for the purpose of modulating pain, promoting relaxation,  increasing ROM, reducing/eliminating soft tissue swelling/inflammation/restriction, improving soft tissue extensibility and allowing for proper ROM for normal function with self care, reaching, carrying, lifting, house/yardwork, driving/computer work    Modalities: heat x10 min     Charges:  Timed Code Treatment Minutes: 35   Total Treatment Minutes: 45     [] EVAL  [x] NT(44441) x  1   [] IONTO  [] NMR (96713) x      [] VASO  [x] Manual (09868) x  1    [] Other:  [] TA x       [] [facility] Traction (44050)  [] ES(attended) (28563)      [] ES (un) (06401):     GOALS:  Patient stated goal: Learn stretches and strength exercises for shoulder    Therapist goals for Patient:   Short Term Goals: To be achieved in: 2 weeks  1. Independent in HEP and progression per patient tolerance, in order to prevent re-injury. 2. Patient will have a decrease in pain to facilitate improvement in movement, function, and ADLs as indicated by Functional Deficits. Long Term Goals: To be achieved in: 8 weeks  1. Disability index score of 0% or less for the DASH to assist with reaching prior level of function. 2. Patient will demonstrate no pain at end range of motion in L shoulder flexion and ABD. 3. Patient will report performing 30 push-ups without any pain during or 48 hours after. 4. Patient will return to  functional activities without increased symptoms or restriction. Progression Towards Functional goals:  [] Patient is progressing as expected towards functional goals listed. [] Progression is slowed due to complexities listed. [] Progression has been slowed due to co-morbidities. [x] Plan just implemented, too soon to assess goals progression  [] Other:     ASSESSMENT: Pt encouraged to run this week to trial calf, instructed to stop when pain starts. Treatment/Activity Tolerance:  [x] Patient tolerated treatment well [] Patient limited by fatique  [] Patient limited by pain  [] Patient limited by other medical complications  [] Other:     Prognosis: [x] Good [] Fair  [] Poor    Patient Requires Follow-up: [x] Yes  [] No    PLAN: Progress gastroc strength in order to negotiate stairs; consider DN NPV  [] Continue per plan of care [] Alter current plan (see comments)  [x] Plan of care initiated [] Hold pending MD visit [] Discharge    Electronically signed by:  Kj Mendoza PT DPT

## 2019-04-17 ENCOUNTER — HOSPITAL ENCOUNTER (OUTPATIENT)
Dept: PHYSICAL THERAPY | Age: 39
Setting detail: THERAPIES SERIES
Discharge: HOME | End: 2019-04-17
Payer: COMMERCIAL

## 2019-04-17 PROCEDURE — 97110 THERAPEUTIC EXERCISES: CPT

## 2019-04-17 PROCEDURE — 97140 MANUAL THERAPY 1/> REGIONS: CPT

## 2019-04-17 NOTE — FLOWSHEET NOTE
TA x       [] [facility] [place] Traction ([id number])  [] ES(attended) (54051)      [] ES (un) (81942):     GOALS:  Patient stated goal: Learn stretches and strength exercises for shoulder    Therapist goals for Patient:   Short Term Goals: To be achieved in: 2 weeks  1. Independent in HEP and progression per patient tolerance, in order to prevent re-injury. 2. Patient will have a decrease in pain to facilitate improvement in movement, function, and ADLs as indicated by Functional Deficits. Long Term Goals: To be achieved in: 8 weeks  1. Disability index score of 0% or less for the DASH to assist with reaching prior level of function. 2. Patient will demonstrate no pain at end range of motion in L shoulder flexion and ABD. 3. Patient will report performing 30 push-ups without any pain during or 48 hours after. 4. Patient will return to  functional activities without increased symptoms or restriction. Progression Towards Functional goals:  [] Patient is progressing as expected towards functional goals listed. [] Progression is slowed due to complexities listed. [] Progression has been slowed due to co-morbidities. [x] Plan just implemented, too soon to assess goals progression  [] Other:     ASSESSMENT: Pt encouraged to run this week to trial calf, instructed to stop when pain starts. Treatment/Activity Tolerance:  [x] Patient tolerated treatment well [] Patient limited by fatique  [] Patient limited by pain  [] Patient limited by other medical complications  [] Other:     Prognosis: [x] Good [] Fair  [] Poor    Patient Requires Follow-up: [x] Yes  [] No    PLAN: Progress gastroc strength in order to negotiate stairs; consider DN NPV  [] Continue per plan of care [] Alter current plan (see comments)  [x] Plan of care initiated [] Hold pending MD visit [] Discharge    Electronically signed by:  Young Ness PT DPT

## 2019-04-22 ENCOUNTER — HOSPITAL ENCOUNTER (OUTPATIENT)
Dept: PHYSICAL THERAPY | Age: 39
Setting detail: THERAPIES SERIES
Discharge: HOME | End: 2019-04-22
Payer: COMMERCIAL

## 2019-04-22 PROCEDURE — 97110 THERAPEUTIC EXERCISES: CPT

## 2019-04-22 PROCEDURE — 97140 MANUAL THERAPY 1/> REGIONS: CPT

## 2019-04-22 NOTE — FLOWSHEET NOTE
[facility]    Physical Therapy Daily Treatment Note  Date:  2019    Patient Name:  Cm Murray    :  1980  MRN: [id number]  Restrictions/Precautions:    Medical/Treatment Diagnosis Information:  · Diagnosis: Acute pain of left shoulder  · Treatment Diagnosis: L shoulder pain  Insurance/Certification information:  PT Insurance Information: [place], $0, $0 co-pay, 61 outpatient visits  Physician Information:  Referring Practitioner: Dr. Jeniffer Harmon of care signed (Y/N):     Date of Patient follow up with Physician:     G-Code (if applicable):      Date G-Code Applied:         Progress Note: [x]  Yes  []  No  Next due by: Visit #10      Latex Allergy:  [x]NO      []YES  Preferred Language for Healthcare:   [x]English       []other:    Visit # Insurance Allowable   4 60     Pain level:  0/10     SUBJECTIVE: Pt unable to run more than once since last visit, no pain with last run, about 2.5 miles at a 8 min pace.      OBJECTIVE: See eval  Observation:   Test measurements:      RESTRICTIONS/PRECAUTIONS: n/a    Exercises/Interventions:   Therapeutic Ex 15' Wt / Resistance Sets/sec Reps Notes   TB rows/ext/IR/ER blue 2 10    Calf stretch  6 30\" Slant board   Eccentric Calf raises  Calf raises  2  3 10  10   Half foam- ecc   MH heel raises 60# 3 10                                                                                                             Manual Intervention 5'       IASTM/STM  25\"  Distal R calf carly at MT junction                                             NMR re-education       SLS  3 30s rebounder half foam                                                        Therapeutic Exercise and NMR EXR  [x] (93286) Provided verbal/tactile cueing for activities related to strengthening, flexibility, endurance, ROM  for improvements in scapular, scapulothoracic and UE control with self care, reaching, carrying, lifting, TA x       [] [facility] Traction (20808)  [] ES(attended) (14390)      [] ES (un) (41613):     GOALS:  Patient stated goal: Learn stretches and strength exercises for shoulder    Therapist goals for Patient:   Short Term Goals: To be achieved in: 2 weeks  1. Independent in HEP and progression per patient tolerance, in order to prevent re-injury. 2. Patient will have a decrease in pain to facilitate improvement in movement, function, and ADLs as indicated by Functional Deficits. Long Term Goals: To be achieved in: 8 weeks  1. Disability index score of 0% or less for the DASH to assist with reaching prior level of function. 2. Patient will demonstrate no pain at end range of motion in L shoulder flexion and ABD. 3. Patient will report performing 30 push-ups without any pain during or 48 hours after. 4. Patient will return to  functional activities without increased symptoms or restriction. Progression Towards Functional goals:  [] Patient is progressing as expected towards functional goals listed. [] Progression is slowed due to complexities listed. [] Progression has been slowed due to co-morbidities. [x] Plan just implemented, too soon to assess goals progression  [] Other:     ASSESSMENT: Pt encouraged to run this week to trial calf, instructed to stop when pain starts. Treatment/Activity Tolerance:  [x] Patient tolerated treatment well [] Patient limited by fatique  [] Patient limited by pain  [] Patient limited by other medical complications  [] Other:     Prognosis: [x] Good [] Fair  [] Poor    Patient Requires Follow-up: [x] Yes  [] No    PLAN: Patient to schedule in 2 weeks following multiple runs. [] Continue per plan of care [] Alter current plan (see comments)  [x] Plan of care initiated [] Hold pending MD visit [] Discharge    Electronically signed by:  Hussein Sahu PT DPT

## 2021-06-16 NOTE — FLOWSHEET NOTE
[place] [facility]    Physical Therapy Daily Treatment Note  Date:  2017    Patient Name:  Alma Conroy    :  1980  MRN: [id number]  Restrictions/Precautions:    Medical/Treatment Diagnosis Information:  Diagnosis: M76.31 (R ITB syndrome), M22.2X1 (R patellofemoral pain syndome)  Treatment Diagnosis: M76.31 (R ITB syndrome), M22.2X1 (R patellofemoral pain syndome)  Insurance/Certification information:  PT Insurance Information: [place]/[id number]  Physician Information:  Referring Practitioner: Dr. Anjelica Bay of care signed (Y/N):     Date of Patient follow up with Physician:     G-Code (if applicable):      Date G-Code Applied:         Progress Note: [x]  Yes  []  No  Next due by: Visit #10       Latex Allergy:  [x]NO      []YES  Preferred Language for Healthcare:   [x]English       []other:    Visit # Insurance Allowable   7 60     Pain level: 0/10     SUBJECTIVE:  Pt reports running 9 miles (5 and 4 mile increments) with no pain in the knee.    OBJECTIVE:   Observation:   Test measurements:      RESTRICTIONS/PRECAUTIONS:  none    Exercises/Interventions:     Therapeutic Ex  15' Resistance Sets/sec Reps Notes   Quad Sets    x30     4 way ankle Black t-band   x30 HEP           Clam shells blue 3 30 B   Donkey kicks Teal 2 15 B   Triple Threat  2 10           Elliptical 5 min       7 min   1 mi          TRX DL/SL squat   2 10    Wall sits  2 45s     2# 2 10    Bosu fwd/side lunge  2 10 B w/ ball toss   Slide Lunge 2# KB 2 10 B   Glute side walks teal  4 laps    BOSU squat  2 10           Manual Intervention         25 min  R knee distal ITB insertion, quad tendon   DN  5  R knee distal ITB insertion, quad tendon   NMR re-education       SL rebounder 6#  x25 B- half foam    SL balance Blue foam 2 30 s 2# water stick  Body blade          Therapeutic Exercise and NMR EXR  [x] (20288) Provided verbal/tactile cueing for activities related to Charges:  Timed Code Treatment Minutes: 55 min   Total Treatment Minutes: 55 mins     [] EVAL  [x] GN(44746) x  2   [] IONTO  [x] NMR (09814) x  2   [] VASO  [] Manual (87733) x       [] Other:  [] TA x       [] [employer] T[employer] (54179)  [] ES(attended) (33983)      [] ES (un) (11372):     GOALS:   Patient stated goal: \"run 5-10 miles w/o pain and run marathon w/o pain)    Therapist goals for Patient:   Short Term Goals: To be achieved in: 2 weeks  1. Independent in HEP and progression per patient tolerance, in order to prevent re-injury. 2. Patient will have a decrease in pain to facilitate improvement in movement, function, and ADLs as indicated by Functional Deficits. Long Term Goals: To be achieved in: 8 weeks  1. Disability index score of 0% or less for the LEFS to assist with reaching prior level of function. 2. Patient will demonstrate an increase in LE strength to 5/5 to allow for proper functional mobility as indicated by patients Functional Deficits. 3. Patient will return to all functional/recreational activities without increased symptoms or restriction. 4. Patient will run 10 miles w/o pain    Progression Towards Functional goals:  [] Patient is progressing as expected towards functional goals listed. [] Progression is slowed due to complexities listed. [] Progression has been slowed due to co-morbidities.   [x] Plan just implemented, too soon to assess goals progression  [] Other:     ASSESSMENT:       Treatment/Activity Tolerance:  [x] Patient tolerated treatment well [] Patient limited by fatique  [] Patient limited by pain  [] Patient limited by other medical complications  [] Other:     Prognosis: [x] Good [] Fair  [] Poor    Patient Requires Follow-up: [x] Yes  [] No    PLAN: Pt to schedule in 2 weeks, pt to cancel if no pain with increase in running distance   [x] Continue per plan of care [] Alter current plan (see comments)  [] Plan of care initiated [] Hold pending MD visit [] Detail Level: Detailed Procedure To Be Performed At Next Visit: Excision Introduction Text (Please End With A Colon): The following procedure was deferred: